# Patient Record
Sex: FEMALE | Race: BLACK OR AFRICAN AMERICAN | NOT HISPANIC OR LATINO | Employment: FULL TIME | URBAN - METROPOLITAN AREA
[De-identification: names, ages, dates, MRNs, and addresses within clinical notes are randomized per-mention and may not be internally consistent; named-entity substitution may affect disease eponyms.]

---

## 2017-01-06 ENCOUNTER — HOSPITAL ENCOUNTER (OUTPATIENT)
Dept: RADIOLOGY | Facility: HOSPITAL | Age: 52
Discharge: HOME | End: 2017-01-06
Attending: FAMILY MEDICINE
Payer: COMMERCIAL

## 2017-01-06 ENCOUNTER — OFFICE VISIT (OUTPATIENT)
Dept: INTERNAL MEDICINE | Facility: CLINIC | Age: 52
End: 2017-01-06
Payer: COMMERCIAL

## 2017-01-06 VITALS
HEIGHT: 62 IN | DIASTOLIC BLOOD PRESSURE: 82 MMHG | WEIGHT: 232.13 LBS | TEMPERATURE: 98 F | BODY MASS INDEX: 42.72 KG/M2 | SYSTOLIC BLOOD PRESSURE: 138 MMHG

## 2017-01-06 DIAGNOSIS — M25.562 LEFT KNEE PAIN, UNSPECIFIED CHRONICITY: ICD-10-CM

## 2017-01-06 DIAGNOSIS — M25.562 LEFT KNEE PAIN, UNSPECIFIED CHRONICITY: Primary | ICD-10-CM

## 2017-01-06 DIAGNOSIS — G43.709 CHRONIC MIGRAINE WITHOUT AURA WITHOUT STATUS MIGRAINOSUS, NOT INTRACTABLE: ICD-10-CM

## 2017-01-06 DIAGNOSIS — I10 ESSENTIAL HYPERTENSION: ICD-10-CM

## 2017-01-06 PROBLEM — E66.01 OBESITY, CLASS III, BMI 40-49.9 (MORBID OBESITY): Status: ACTIVE | Noted: 2017-01-06

## 2017-01-06 PROCEDURE — 3079F DIAST BP 80-89 MM HG: CPT | Mod: S$GLB,,, | Performed by: FAMILY MEDICINE

## 2017-01-06 PROCEDURE — 99214 OFFICE O/P EST MOD 30 MIN: CPT | Mod: S$GLB,,, | Performed by: FAMILY MEDICINE

## 2017-01-06 PROCEDURE — 73560 X-RAY EXAM OF KNEE 1 OR 2: CPT | Mod: 26,59,LT, | Performed by: RADIOLOGY

## 2017-01-06 PROCEDURE — 73562 X-RAY EXAM OF KNEE 3: CPT | Mod: 26,LT,, | Performed by: RADIOLOGY

## 2017-01-06 PROCEDURE — 3075F SYST BP GE 130 - 139MM HG: CPT | Mod: S$GLB,,, | Performed by: FAMILY MEDICINE

## 2017-01-06 PROCEDURE — 1159F MED LIST DOCD IN RCRD: CPT | Mod: S$GLB,,, | Performed by: FAMILY MEDICINE

## 2017-01-06 PROCEDURE — 73560 X-RAY EXAM OF KNEE 1 OR 2: CPT | Mod: TC,59,PO,RT,LT

## 2017-01-06 PROCEDURE — 99999 PR PBB SHADOW E&M-EST. PATIENT-LVL III: CPT | Mod: PBBFAC,,, | Performed by: FAMILY MEDICINE

## 2017-01-06 RX ORDER — DICLOFENAC SODIUM 75 MG/1
75 TABLET, DELAYED RELEASE ORAL 2 TIMES DAILY
Qty: 30 TABLET | Refills: 0 | Status: SHIPPED | OUTPATIENT
Start: 2017-01-06 | End: 2017-01-27 | Stop reason: ALTCHOICE

## 2017-01-06 RX ORDER — BUTALBITAL, ACETAMINOPHEN AND CAFFEINE 50; 325; 40 MG/1; MG/1; MG/1
1 TABLET ORAL DAILY PRN
Qty: 30 TABLET | Refills: 0 | Status: SHIPPED | OUTPATIENT
Start: 2017-01-06 | End: 2017-02-22 | Stop reason: SDUPTHER

## 2017-01-06 RX ORDER — LISINOPRIL AND HYDROCHLOROTHIAZIDE 20; 25 MG/1; MG/1
1 TABLET ORAL DAILY
Qty: 90 TABLET | Refills: 3 | Status: SHIPPED | OUTPATIENT
Start: 2017-01-06

## 2017-01-06 NOTE — PROGRESS NOTES
"Subjective:   Patient ID: Rubi Powell is a 51 y.o. female.  Chief Complaint:  Establish Care    HPI Comments: Here to establish care.  Needs refills on blood pressure and migraine headache medication.  Complains of left knee pain.  History osteoarthritis both knees but this feels different.  Denies trauma or inciting event.  Pain medial and anterior.  Reports swelling.  Decreased range of motion.  Instability.  Some snapping/clicking.  Took a friend's Voltaren which helped.  Reports takes blood pressure daily at home and all readings less than 140/90.  Reports takes Fioricet as needed for headache.  Questionably 4-6 a month.  Only needs one pill for each episode.    Knee Pain    There was no injury mechanism. The pain is present in the left knee. The quality of the pain is described as aching. The pain is at a severity of 5/10. The pain is moderate. The pain has been constant since onset. Associated symptoms include a loss of motion and muscle weakness. Pertinent negatives include no inability to bear weight, loss of sensation, numbness or tingling. The symptoms are aggravated by movement, weight bearing and palpation. She has tried NSAIDs for the symptoms. The treatment provided moderate relief.     Review of Systems   Respiratory: Negative for cough, chest tightness, shortness of breath and wheezing.    Cardiovascular: Negative for chest pain, palpitations and leg swelling.   Gastrointestinal: Negative for abdominal pain, constipation, diarrhea, nausea and vomiting.   Genitourinary: Negative for difficulty urinating.   Musculoskeletal: Negative for myalgias.   Skin: Negative for rash.   Neurological: Negative for tingling, syncope, weakness, light-headedness and numbness.     Objective:     Visit Vitals    /82    Temp 97.5 °F (36.4 °C) (Tympanic)    Ht 5' 2" (1.575 m)    Wt 105.3 kg (232 lb 2.3 oz)    BMI 42.46 kg/m2     Physical Exam   Constitutional: Vital signs are normal. She appears " well-developed and well-nourished. No distress.   Morbid Obesity   Neck: No JVD present.   Cardiovascular: Normal rate, regular rhythm and normal heart sounds.  Exam reveals no gallop and no friction rub.    No murmur heard.  Pulmonary/Chest: Effort normal and breath sounds normal. She has no wheezes. She has no rhonchi. She has no rales.   Abdominal: Soft. She exhibits no distension. There is no tenderness.   Musculoskeletal: She exhibits no edema.        Left knee: She exhibits decreased range of motion, swelling and MCL laxity. She exhibits no effusion, no ecchymosis, no deformity, no laceration, no erythema, normal alignment, no LCL laxity, normal patellar mobility, no bony tenderness and normal meniscus. Tenderness found. Medial joint line, MCL and patellar tendon tenderness noted. No LCL tenderness noted.   Skin: Skin is warm and dry. No rash noted.   Psychiatric: She has a normal mood and affect.   Nursing note and vitals reviewed.    Assessment:     1. Left knee pain, unspecified chronicity    2. Essential hypertension    3. Chronic migraine without aura without status migrainosus, not intractable      Plan:   Left knee pain, unspecified chronicity  -     X-Ray Knee Complete 4 or More Views Left; Future; Expected date: 1/6/17  -     diclofenac (VOLTAREN) 75 MG EC tablet; Take 1 tablet (75 mg total) by mouth 2 (two) times daily.  Dispense: 30 tablet; Refill: 0  Rice, NSAIDs, check x-ray, reevaluate next week.  If no significant improvement, consider MRI for possible MCL or meniscal tear.    Essential hypertension  -     lisinopril-hydrochlorothiazide (PRINZIDE,ZESTORETIC) 20-25 mg Tab; Take 1 tablet by mouth once daily.  Dispense: 90 tablet; Refill: 3  BP controlled.  Continue present medication.    Chronic migraine without aura without status migrainosus, not intractable  -     butalbital-acetaminophen-caffeine -40 mg (FIORICET, ESGIC) -40 mg per tablet; Take 1 tablet by mouth daily as needed for  Headaches.  Dispense: 30 tablet; Refill: 0  Refilled 30 pills.  Monitor use.  Discussed this prescription should last 3 months.  If refill needed sooner, needs to consider daily preventative medication.    RTC  1 week

## 2017-01-06 NOTE — MR AVS SNAPSHOT
[facility]  [address] [place] [address]  Phone: [phone]  Fax: [phone]                  Rubi Powell   2017 2:40 PM   Office Visit    Description:  Female : 1965   Provider:  Gregory Vega MD   Department:  [facility] - Internal Medicine           Reason for Visit     Establish Care           Diagnoses this Visit        Comments    Left knee pain, unspecified chronicity    -  Primary     Essential hypertension         Chronic migraine without aura without status migrainosus, not intractable                To Do List           Future Appointments        Provider Department Dept Phone    2017 3:30 PM SUM XR2 [facility] [phone]    2017 2:40 PM Gregory Vega MD [facility] [phone]      Goals (5 Years of Data)     None      Follow-Up and Disposition     Return in about 1 week (around 2017).       These Medications        Disp Refills Start End    lisinopril-hydrochlorothiazide (PRINZIDE,ZESTORETIC) 20-25 mg Tab 90 tablet 3 2017     Take 1 tablet by mouth once daily. - Oral    Pharmacy: [facility] - [facility]  [facility] Ph #: [phone]       diclofenac (VOLTAREN) 75 MG EC tablet 30 tablet 0 2017     Take 1 tablet (75 mg total) by mouth 2 (two) times daily. - Oral    Pharmacy: [facility] - [place] -  [facility] Ph #: [phone]       butalbital-acetaminophen-caffeine -40 mg (FIORICET, ESGIC) -40 mg per tablet 30 tablet 0 2017     Take 1 tablet by mouth daily as needed for Headaches. - Oral    Pharmacy: [facility] - [place], [place] -  [facility] Ph #: [phone]         [facility] On [facility]     [facility] On Call Nurse Care Line -  Assistance  Registered nurses in the [facility] provide clinical advisement, health education, appointment booking, and other advisory services.  Call for this  free service at [phone].             Medications           Message regarding Medications     Verify the changes and/or additions to your medication regime listed below are the same as discussed with your clinician today.  If any of these changes or additions are incorrect, please notify your healthcare provider.        START taking these NEW medications        Refills    diclofenac (VOLTAREN) 75 MG EC tablet 0    Sig: Take 1 tablet (75 mg total) by mouth 2 (two) times daily.    Class: Normal    Route: Oral      CHANGE how you are taking these medications     Start Taking Instead of    lisinopril-hydrochlorothiazide (PRINZIDE,ZESTORETIC) 20-25 mg Tab lisinopril-hydrochlorothiazide (PRINZIDE,ZESTORETIC) 20-25 mg Tab    Dosage:  Take 1 tablet by mouth once daily.     Reason for Change:  Reorder     butalbital-acetaminophen-caffeine -40 mg (FIORICET, ESGIC) -40 mg per tablet butalbital-acetaminophen-caffeine -40 mg (FIORICET, ESGIC) -40 mg per tablet    Dosage:  Take 1 tablet by mouth daily as needed for Headaches.     Reason for Change:  Reorder       STOP taking these medications     promethazine-dextromethorphan (PROMETHAZINE-DM) 6.25-15 mg/5 mL Syrp Take 5 mLs by mouth nightly as needed (Cough).    fluticasone (FLONASE) 50 mcg/actuation nasal spray 2 sprays by Each Nare route once daily.           Verify that the below list of medications is an accurate representation of the medications you are currently taking.  If none reported, the list may be blank. If incorrect, please contact your healthcare provider. Carry this list with you in case of emergency.           Current Medications     butalbital-acetaminophen-caffeine -40 mg (FIORICET, ESGIC) -40 mg per tablet Take 1 tablet by mouth daily as needed for Headaches.    lisinopril-hydrochlorothiazide (PRINZIDE,ZESTORETIC) 20-25 mg Tab Take 1 tablet by mouth once daily.    diclofenac (VOLTAREN) 75 MG EC tablet Take 1 tablet  "(75 mg total) by mouth 2 (two) times daily.           Clinical Reference Information           Vital Signs - Last Recorded  Most recent update: 2017  3:09 PM by Gregory Vega MD    BP Temp Ht Wt BMI    138/82 97.5 °F (36.4 °C) (Tympanic) 5' 2" (1.575 m) 105.3 kg (232 lb 2.3 oz) 42.46 kg/m2      Blood Pressure          Most Recent Value    BP  138/82      Allergies as of 2017     No Known Allergies      Immunizations Administered on Date of Encounter - 2017     None      Orders Placed During Today's Visit     Future Labs/Procedures Expected by Expires    X-Ray Knee Complete 4 or More Views Left  2017      [facility] Sign-Up     Activating your [facility] account is as easy as 1-2-3!     1) Visit [email or web address], select Sign Up Now, enter this activation code and your date of birth, then select Next.  [email or web address]  Expires: 2017  3:13 PM      2) Create a username and password to use when you visit [facility] in the future and select a security question in case you lose your password and select Next.    3) Enter your e-mail address and click Sign Up!    Additional Information  If you have questions, please e-mail [email or web address] or call [phone] to talk to our [facility] staff. Remember, [facility] is NOT to be used for urgent needs. For medical emergencies, dial 911.         Smoking Cessation     If you would like to quit smoking:   You may be eligible for free services if you are a [place] resident and started smoking cigarettes before 1988.  Call the Smoking Cessation Trust (SCT) toll free at (980) 198-4991 or (685) 014-0286.   Call 2-861-QUIT-NOW if you do not meet the above criteria.            "

## 2017-01-10 ENCOUNTER — TELEPHONE (OUTPATIENT)
Dept: INTERNAL MEDICINE | Facility: CLINIC | Age: 52
End: 2017-01-10

## 2017-01-13 ENCOUNTER — OFFICE VISIT (OUTPATIENT)
Dept: INTERNAL MEDICINE | Facility: CLINIC | Age: 52
End: 2017-01-13
Payer: COMMERCIAL

## 2017-01-13 VITALS
SYSTOLIC BLOOD PRESSURE: 140 MMHG | TEMPERATURE: 97 F | WEIGHT: 236.13 LBS | DIASTOLIC BLOOD PRESSURE: 72 MMHG | BODY MASS INDEX: 43.19 KG/M2

## 2017-01-13 DIAGNOSIS — M23.92 INTERNAL DERANGEMENT OF KNEE, LEFT: ICD-10-CM

## 2017-01-13 DIAGNOSIS — M25.562 CHRONIC PAIN OF LEFT KNEE: Primary | ICD-10-CM

## 2017-01-13 DIAGNOSIS — G89.29 CHRONIC PAIN OF LEFT KNEE: Primary | ICD-10-CM

## 2017-01-13 PROCEDURE — 3077F SYST BP >= 140 MM HG: CPT | Mod: S$GLB,,, | Performed by: FAMILY MEDICINE

## 2017-01-13 PROCEDURE — 3078F DIAST BP <80 MM HG: CPT | Mod: S$GLB,,, | Performed by: FAMILY MEDICINE

## 2017-01-13 PROCEDURE — 99999 PR PBB SHADOW E&M-EST. PATIENT-LVL III: CPT | Mod: PBBFAC,,, | Performed by: FAMILY MEDICINE

## 2017-01-13 PROCEDURE — 1159F MED LIST DOCD IN RCRD: CPT | Mod: S$GLB,,, | Performed by: FAMILY MEDICINE

## 2017-01-13 PROCEDURE — 99213 OFFICE O/P EST LOW 20 MIN: CPT | Mod: S$GLB,,, | Performed by: FAMILY MEDICINE

## 2017-01-13 RX ORDER — TRAMADOL HYDROCHLORIDE 50 MG/1
50 TABLET ORAL EVERY 6 HOURS PRN
Qty: 30 TABLET | Refills: 0 | Status: SHIPPED | OUTPATIENT
Start: 2017-01-13 | End: 2017-01-23

## 2017-01-13 NOTE — PROGRESS NOTES
Subjective:   Patient ID: Rubi Powell is a 51 y.o. female.  Chief Complaint:  Knee Pain    HPI Comments: Presents for follow-up of chronic left knee pain.  Voltaren did not help.  Still reports instability but no popping, snapping, clicking, or locking.  X-ray with arthritis more prominent left than right.  But with lateral compartment narrowing.  Patient's pain and tenderness is medial.  No overall change in pain pattern since last visit.      Review of Systems   Constitutional: Negative for chills and fever.   Gastrointestinal: Negative for abdominal pain, diarrhea, nausea and vomiting.   Musculoskeletal: Positive for arthralgias. Negative for back pain, gait problem, joint swelling, myalgias, neck pain and neck stiffness.   Skin: Negative for rash.     Objective:     Visit Vitals    BP (!) 140/72 (BP Location: Right arm, Patient Position: Sitting, BP Method: Manual)    Temp 97.1 °F (36.2 °C) (Tympanic)    Wt 107.1 kg (236 lb 1.8 oz)    BMI 43.19 kg/m2     Physical Exam   Constitutional: Vital signs are normal. She appears well-developed and well-nourished. No distress.   Morbid Obesity   Musculoskeletal: She exhibits no edema.        Left knee: She exhibits decreased range of motion, swelling and MCL laxity. She exhibits no effusion, no ecchymosis, no deformity, no laceration, no erythema, normal alignment, no LCL laxity, normal patellar mobility, no bony tenderness and normal meniscus. Tenderness found. Medial joint line, MCL and patellar tendon tenderness noted. No LCL tenderness noted.   Skin: Skin is warm and dry. No rash noted.   Psychiatric: She has a normal mood and affect.   Nursing note and vitals reviewed.    Assessment:     1. Chronic pain of left knee    2. Internal derangement of knee, left      Plan:   Based on minimal response to NSAIDs, x-ray with lateral but not medial arthritis, and area of tenderness and laxity, high probability of MCL strain/tear or meniscal tear.  Check MRI.   Tramadol for pain.  Additional treatment recommendations after review of imaging.  -     MRI Lower Extremity Joint WO Cont Left; Future; Expected date: 1/13/17  -     tramadol (ULTRAM) 50 mg tablet; Take 1 tablet (50 mg total) by mouth every 6 (six) hours as needed for Pain.  Dispense: 30 tablet; Refill: 0  RTC 1 month

## 2017-01-13 NOTE — MR AVS SNAPSHOT
[facility]  [address] [place] [address]  Phone: [phone]  Fax: [phone]                  Rubi Powell   2017 2:40 PM   Office Visit    Description:  Female : 1965   Provider:  Gregory Vega MD   Department:  [facility] - Internal Medicine           Reason for Visit     Knee Pain           Diagnoses this Visit        Comments    Chronic pain of left knee    -  Primary     Internal derangement of knee, left                To Do List           Future Appointments        Provider Department Dept Phone    2017 7:15 AM [facility] MRI [facility] [phone]      Goals (5 Years of Data)     None      Follow-Up and Disposition     Return in about 1 month (around 2017).       These Medications        Disp Refills Start End    tramadol (ULTRAM) 50 mg tablet 30 tablet 0 2017    Take 1 tablet (50 mg total) by mouth every 6 (six) hours as needed for Pain. - Oral    Pharmacy: [facility] - [place] [address] Ph #: [phone]         [facility] On Call     [facility] Nurse Care Line -  Assistance  Registered nurses in the [facility] provide clinical advisement, health education, appointment booking, and other advisory services.  Call for this free service at [phone].             Medications           Message regarding Medications     Verify the changes and/or additions to your medication regime listed below are the same as discussed with your clinician today.  If any of these changes or additions are incorrect, please notify your healthcare provider.        START taking these NEW medications        Refills    tramadol (ULTRAM) 50 mg tablet 0    Sig: Take 1 tablet (50 mg total) by mouth every 6 (six) hours as needed for Pain.    Class: Print    Route: Oral           Verify that the below list of medications is an accurate representation of the medications you are currently  taking.  If none reported, the list may be blank. If incorrect, please contact your healthcare provider. Carry this list with you in case of emergency.           Current Medications     diclofenac (VOLTAREN) 75 MG EC tablet Take 1 tablet (75 mg total) by mouth 2 (two) times daily.    lisinopril-hydrochlorothiazide (PRINZIDE,ZESTORETIC) 20-25 mg Tab Take 1 tablet by mouth once daily.    butalbital-acetaminophen-caffeine -40 mg (FIORICET, ESGIC) -40 mg per tablet Take 1 tablet by mouth daily as needed for Headaches.    tramadol (ULTRAM) 50 mg tablet Take 1 tablet (50 mg total) by mouth every 6 (six) hours as needed for Pain.           Clinical Reference Information           Vital Signs - Last Recorded  Most recent update: 2017  2:37 PM by Taylor Mcintosh LPN    BP Temp Wt BMI       (!) 140/72 (BP Location: Right arm, Patient Position: Sitting, BP Method: Manual) 97.1 °F (36.2 °C) (Tympanic) 107.1 kg (236 lb 1.8 oz) 43.19 kg/m2       Blood Pressure          Most Recent Value    BP  (!)  140/72      Allergies as of 2017     No Known Allergies      Immunizations Administered on Date of Encounter - 2017     None      Orders Placed During Today's Visit     Future Labs/Procedures Expected by Expires    MRI Lower Extremity Joint WO Cont Left  2017      [facility] Sign-Up     Activating your [facility] account is as easy as 1-2-3!     1) Visit [email or web address], select Sign Up Now, enter this activation code and your date of birth, then select Next.  [email or web address]  Expires: 2017  3:13 PM      2) Create a username and password to use when you visit [facility] in the future and select a security question in case you lose your password and select Next.    3) Enter your e-mail address and click Sign Up!    Additional Information  If you have questions, please e-mail [email or web address] or call [phone] to talk to our [facility] staff. Remember, [facility] is NOT to be used for  urgent needs. For medical emergencies, dial 911.         Smoking Cessation     If you would like to quit smoking:   You may be eligible for free services if you are a [place] resident and started smoking cigarettes before September 1, 1988.  Call the Smoking Cessation Trust (SCT) toll free at (000) 510-8118 or (752) 753-8747.   Call 2-159-QUIT-NOW if you do not meet the above criteria.

## 2017-01-18 ENCOUNTER — TELEPHONE (OUTPATIENT)
Dept: RADIOLOGY | Facility: HOSPITAL | Age: 52
End: 2017-01-18

## 2017-01-19 ENCOUNTER — TELEPHONE (OUTPATIENT)
Dept: INTERNAL MEDICINE | Facility: CLINIC | Age: 52
End: 2017-01-19

## 2017-01-19 ENCOUNTER — HOSPITAL ENCOUNTER (OUTPATIENT)
Dept: RADIOLOGY | Facility: HOSPITAL | Age: 52
Discharge: HOME | End: 2017-01-19
Attending: FAMILY MEDICINE
Payer: COMMERCIAL

## 2017-01-19 DIAGNOSIS — G89.29 CHRONIC PAIN OF LEFT KNEE: ICD-10-CM

## 2017-01-19 DIAGNOSIS — M23.92 INTERNAL DERANGEMENT OF KNEE, LEFT: Primary | ICD-10-CM

## 2017-01-19 DIAGNOSIS — M25.562 CHRONIC PAIN OF LEFT KNEE: ICD-10-CM

## 2017-01-19 DIAGNOSIS — M23.92 INTERNAL DERANGEMENT OF KNEE, LEFT: ICD-10-CM

## 2017-01-19 PROCEDURE — 73721 MRI JNT OF LWR EXTRE W/O DYE: CPT | Mod: 26,LT,, | Performed by: RADIOLOGY

## 2017-01-19 PROCEDURE — 73721 MRI JNT OF LWR EXTRE W/O DYE: CPT | Mod: TC,PO,LT

## 2017-01-19 NOTE — TELEPHONE ENCOUNTER
----- Message from Gregory Vega MD sent at 1/19/2017  9:37 AM CST -----  MRI confirms a tear for medial meniscus.  She will need a scope/procedure to fix this.  Ortho [facility] referral ordered.

## 2017-01-27 ENCOUNTER — OFFICE VISIT (OUTPATIENT)
Dept: ORTHOPEDICS | Facility: CLINIC | Age: 52
End: 2017-01-27
Payer: COMMERCIAL

## 2017-01-27 VITALS
SYSTOLIC BLOOD PRESSURE: 124 MMHG | BODY MASS INDEX: 42.6 KG/M2 | WEIGHT: 231.5 LBS | HEIGHT: 62 IN | DIASTOLIC BLOOD PRESSURE: 85 MMHG | HEART RATE: 78 BPM

## 2017-01-27 DIAGNOSIS — G89.29 CHRONIC PAIN OF LEFT KNEE: Primary | ICD-10-CM

## 2017-01-27 DIAGNOSIS — M25.562 CHRONIC PAIN OF LEFT KNEE: Primary | ICD-10-CM

## 2017-01-27 DIAGNOSIS — M23.204 DEGENERATIVE TEAR OF MEDIAL MENISCUS OF LEFT KNEE: ICD-10-CM

## 2017-01-27 PROCEDURE — 3079F DIAST BP 80-89 MM HG: CPT | Mod: S$GLB,,, | Performed by: PHYSICIAN ASSISTANT

## 2017-01-27 PROCEDURE — 3074F SYST BP LT 130 MM HG: CPT | Mod: S$GLB,,, | Performed by: PHYSICIAN ASSISTANT

## 2017-01-27 PROCEDURE — 99214 OFFICE O/P EST MOD 30 MIN: CPT | Mod: 25,S$GLB,, | Performed by: PHYSICIAN ASSISTANT

## 2017-01-27 PROCEDURE — 1159F MED LIST DOCD IN RCRD: CPT | Mod: S$GLB,,, | Performed by: PHYSICIAN ASSISTANT

## 2017-01-27 PROCEDURE — 20610 DRAIN/INJ JOINT/BURSA W/O US: CPT | Mod: LT,S$GLB,, | Performed by: PHYSICIAN ASSISTANT

## 2017-01-27 PROCEDURE — 99999 PR PBB SHADOW E&M-EST. PATIENT-LVL III: CPT | Mod: PBBFAC,,, | Performed by: PHYSICIAN ASSISTANT

## 2017-01-27 RX ORDER — METHYLPREDNISOLONE ACETATE 80 MG/ML
80 INJECTION, SUSPENSION INTRA-ARTICULAR; INTRALESIONAL; INTRAMUSCULAR; SOFT TISSUE ONCE
Status: COMPLETED | OUTPATIENT
Start: 2017-01-27 | End: 2017-01-27

## 2017-01-27 RX ORDER — MELOXICAM 15 MG/1
15 TABLET ORAL DAILY
Qty: 30 TABLET | Refills: 1 | Status: SHIPPED | OUTPATIENT
Start: 2017-01-27 | End: 2017-04-12 | Stop reason: SDUPTHER

## 2017-01-27 RX ADMIN — METHYLPREDNISOLONE ACETATE 80 MG: 80 INJECTION, SUSPENSION INTRA-ARTICULAR; INTRALESIONAL; INTRAMUSCULAR; SOFT TISSUE at 09:01

## 2017-01-27 NOTE — LETTER
January 27, 2017      Gregory Vega MD  [address] [place] [address]           [facility]  [address] [place] [address]  Phone: [phone]  Fax: [phone]          Patient: Rubi Powell   MR Number: [id number]   YOB: 1965   Date of Visit: 1/27/2017       Dear Dr. Gregory Vega:    Thank you for referring Rubi Powell to me for evaluation. Attached you will find relevant portions of my assessment and plan of care.    If you have questions, please do not hesitate to call me. I look forward to following Rubi Powell along with you.    Sincerely,    AKBAR Franceosure  CC:  No Recipients    If you would like to receive this communication electronically, please contact [email or web address] or [phone] to request more information on [employer] access.    For providers and/or their staff who would like to refer a patient to [facility], please contact us through our one-stop-shop provider referral line, [facility], at [phone].    If you feel you have received this communication in error or would no longer like to receive these types of communications, please e-mail [email or web address]

## 2017-01-27 NOTE — PROGRESS NOTES
Subjective:      Patient ID: Rubi Powell is a 51 y.o. female.    Chief Complaint: Pain of the Left Knee      HPI: Rubi Powell  is a 51 y.o. female who c/o Pain of the Left Knee   for duration of about 2 months.  She denies an inciting injury.  He states it started bothering her after she went to a parade around the time of Thanksgiving.  Pain level at worst is an 8 out of 10 in severity.  Quality is aching, throbbing, sharp, burning, and shooting.  It is constant and intermittently worse.  Alleviating factors include nothing.  If she has had no relief with tramadol.  No relief with Voltaren.  Aggravating factors include morning time as well as nighttime.  She has difficulty with going up and down stairs, as well as kneeling and squatting.  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  She complains of associated swelling intermittently as well as some stiffness.    Review of Systems   Constitution: Negative for fever.   HENT: Negative for headaches.    Cardiovascular: Negative for chest pain.   Respiratory: Negative for cough and shortness of breath.    Skin: Negative for rash.   Musculoskeletal: Positive for joint pain, joint swelling and stiffness.   Gastrointestinal: Negative for heartburn.   Neurological: Negative for numbness.         Objective:        General    Nursing note and vitals reviewed.  Constitutional: She is oriented to person, place, and time. She appears well-developed and well-nourished.   HENT:   Head: Normocephalic and atraumatic.   Eyes: EOM are normal.   Cardiovascular: Normal rate and regular rhythm.    Pulmonary/Chest: Effort normal.   Abdominal: Soft.   Neurological: She is alert and oriented to person, place, and time.   Psychiatric: She has a normal mood and affect. Her behavior is normal.             Left Knee Exam     Inspection   Erythema: absent  Swelling: absent  Effusion: absent  Deformity: deformity  Bruising: absent    Tenderness   The  patient tender to palpation of the condyle, medial joint line, lateral joint line and pes anserinus.    Range of Motion   Extension: normal   Flexion:  110 abnormal     Tests   Meniscus   Cora:  Medial - positive Lateral - positive  Stability Lachman: normal (-1 to 2mm) PCL-Posterior Drawer: normal (0 to 2mm)  MCL - Valgus: normal (0 to 2mm)  LCL - Varus: normal (0 to 2mm)  Patella   Patellar Apprehension: negative  Patellar Tracking: normal  Patellar Grind: negative    Other   Meniscal Cyst: absent  Popliteal (Baker's) Cyst: absent  Sensation: normal    Comments:  Comp soft, cap refill < 2 sec.    Muscle Strength   Left Lower Extremity   Quadriceps:  5/5   Hamstrin/5     Vascular Exam       Edema  Left Lower Leg: absent            Xray:   Left knee from 2017 images and report were reviewed today.  I agree with the radiologist's interpretation.  None there is mild joint space narrowing involving the lateral compartment the left knee.  No acute fracture or dislocation.  No joint effusion.    MRI:  Left knee from 2017 images and report were reviewed today.  I agree with the radiologist's interpretation.  Horizontal degenerative type tear seen involving the body of the medial meniscus.      Assessment:       Encounter Diagnoses   Name Primary?   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    Diagnoses and all orders for this visit:    Chronic pain of left knee  -     meloxicam (MOBIC) 15 MG tablet; Take 1 tablet (15 mg total) by mouth once daily. Take with food  -     Ambulatory Referral to Physical/Occupational Therapy  -     methylPREDNISolone acetate injection 80 mg; Inject 1 mL (80 mg total) into the articular space once.    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 Take with food  -     Ambulatory Referral to  Physical/Occupational Therapy  -     methylPREDNISolone acetate injection 80 mg; Inject 1 mL (80 mg total) into the articular space once.    Ms. Powell comes in today to be evaluated for the left knee.  This is new problems to me.  She's had MRI and x-ray evaluation.  No evidence of significant arthritis noted on either exam.  He does have a degenerative tear of her medial meniscus on the left knee.  We had an extensive discussion regarding since for the left knee.  We have discussed surgical options as well as nonsurgical options.  We have discussed risks and benefits of both options.  At this time, she states surgery is not really an option for her.  She like to try some conservative management, at least until she can get coverage for herself at work.  She works as a caregiver and also works as a .  At this time, she would like to proceed with a steroid injection in the left knee.  We have discussed risks and benefits of a steroid injection.  She has tolerated them well in the past in the right knee.  I will also get her into a neoprene hinged knee brace.  She states that the anti-inflammatory she is currently taking is not helping, so I'll discontinue that and prescribe meloxicam instead.  Additionally, I would like her to do some formal physical therapy.  She is concerned about financial responsibility.  I've encouraged her to go to at least 1 or 2 sessions so they can give her a home exercise program to work on her own.  I certainly do not want it to be a financial burden for her.  Rather than have her make an appointment to come back and see me, I would ask that she she is doing over the next 4-6 weeks.  If she is not improving, I would like her to see one of our surgeons here in the department.  If she has any problems, she will certainly let me know.  If she develops any GI side effects from the meloxicam, she should discontinue it.  Patient verbalizes understanding and agrees with the  above.    Return in about 5 weeks (around 3/3/2017), or if symptoms worsen or fail to improve.        The patient understands, chooses and consents to this plan and accepts all   the risks which include but are not limited to the risks mentioned above.     Left Knee Injection Report:  After verbal consent was obtained for left knee injection, patient ID, site, and side were verified.  The  left  knee was sterilly prepped in the standard fashion.  A 22-gauge needle was introduced into left knee joint from an veronica-lateral site without complication. The left knee was then injected with 20 mg lidocaine plain and 80 mg depomedrol.  A sterile bandaid was applied.  The patient was informed to apply an ice pack approximately 10min once arriving home and not to do anything strenuous for 24hours.  She was instructed to call if there were any problems. The patient was discharged in stable condition.    Disclaimer: This note was prepared using a voice recognition system and is likely to have sound alike errors within the text.

## 2017-02-22 ENCOUNTER — TELEPHONE (OUTPATIENT)
Dept: INTERNAL MEDICINE | Facility: CLINIC | Age: 52
End: 2017-02-22

## 2017-02-22 DIAGNOSIS — G43.709 CHRONIC MIGRAINE WITHOUT AURA WITHOUT STATUS MIGRAINOSUS, NOT INTRACTABLE: ICD-10-CM

## 2017-02-22 RX ORDER — BUTALBITAL, ACETAMINOPHEN AND CAFFEINE 50; 325; 40 MG/1; MG/1; MG/1
1 TABLET ORAL DAILY PRN
Qty: 30 TABLET | Refills: 0 | Status: SHIPPED | OUTPATIENT
Start: 2017-02-22 | End: 2017-04-12 | Stop reason: SDUPTHER

## 2017-02-22 NOTE — TELEPHONE ENCOUNTER
----- Message from Danita Pablo sent at 2/22/2017 11:24 AM CST -----  Contact: pt  States she needs a refill on her headache medicine. Pt uses      [facility] - [place] [address]  [address] [place] [address]  Phone: [phone] Fax: [phone]    Please call pt at [phone]. Thank you

## 2017-04-05 ENCOUNTER — TELEPHONE (OUTPATIENT)
Dept: INTERNAL MEDICINE | Facility: CLINIC | Age: 52
End: 2017-04-05

## 2017-04-05 NOTE — TELEPHONE ENCOUNTER
Pt had recent hospital visit at [facility] and had pna. Appears needs followup but ED visit sent to me. I have not seen patient. Appears she is seeing you as PCP. You can determine followup. Thanks.

## 2017-04-06 NOTE — TELEPHONE ENCOUNTER
MD Taylor Jaramillo LPN        Caller: Unspecified (Yesterday,  1:05 PM)                     Please call and schedule 6 week follow up

## 2017-04-12 ENCOUNTER — TELEPHONE (OUTPATIENT)
Dept: INTERNAL MEDICINE | Facility: CLINIC | Age: 52
End: 2017-04-12

## 2017-04-12 DIAGNOSIS — M25.562 CHRONIC PAIN OF LEFT KNEE: ICD-10-CM

## 2017-04-12 DIAGNOSIS — G89.29 CHRONIC PAIN OF LEFT KNEE: ICD-10-CM

## 2017-04-12 DIAGNOSIS — M23.204 DEGENERATIVE TEAR OF MEDIAL MENISCUS OF LEFT KNEE: ICD-10-CM

## 2017-04-12 DIAGNOSIS — G43.709 CHRONIC MIGRAINE WITHOUT AURA WITHOUT STATUS MIGRAINOSUS, NOT INTRACTABLE: ICD-10-CM

## 2017-04-12 RX ORDER — BUTALBITAL, ACETAMINOPHEN AND CAFFEINE 50; 325; 40 MG/1; MG/1; MG/1
1 TABLET ORAL DAILY PRN
Qty: 30 TABLET | Refills: 0 | Status: SHIPPED | OUTPATIENT
Start: 2017-04-12 | End: 2017-06-07 | Stop reason: SDUPTHER

## 2017-04-12 RX ORDER — MELOXICAM 15 MG/1
15 TABLET ORAL DAILY
Qty: 30 TABLET | Refills: 0 | Status: SHIPPED | OUTPATIENT
Start: 2017-04-12

## 2017-04-12 NOTE — TELEPHONE ENCOUNTER
S/w pt. Pt is requesting refill for Fiorcet. Told pt that I would send message to Dr. Vega and rx refill will be sent in shortly. Pt verbalized understanding.

## 2017-04-12 NOTE — TELEPHONE ENCOUNTER
----- Message from Erma Conroy sent at 4/12/2017  9:16 AM CDT -----  Contact: Patient  Patient is requesting a refill on Meloxicam, Tramadol and Ferocet- [place]. Please call patient back if needed at [phone]. Thank you

## 2017-04-12 NOTE — TELEPHONE ENCOUNTER
Last visit 1/13/17. Last refill for Mobic-1/27/17, last refill for Tramadol- 1/23/17, and last refill for Fiorcet-2/22/17. Next appt is 5/18/17. Please advise.

## 2017-04-13 ENCOUNTER — TELEPHONE (OUTPATIENT)
Dept: INTERNAL MEDICINE | Facility: CLINIC | Age: 52
End: 2017-04-13

## 2017-04-13 NOTE — TELEPHONE ENCOUNTER
----- Message from Carmen Gunn sent at 4/13/2017 12:03 PM CDT -----  Contact: Hema ( [facility] )   Hema ( [facility] ) states he is f/u on a refill request that was faxed over.          .  [facility] - [place], [place] - [address]  [address]  [place] [address]  Phone: [phone] Fax: [phone]

## 2017-04-18 ENCOUNTER — TELEPHONE (OUTPATIENT)
Dept: INTERNAL MEDICINE | Facility: CLINIC | Age: 52
End: 2017-04-18

## 2017-04-18 NOTE — TELEPHONE ENCOUNTER
----- Message from Danielle Bailey sent at 4/18/2017 11:53 AM CDT -----  Contact: [facility]  Requesting a Rx refill for Tramadol.    Pt use..    [facility] - [place] - [address]  [address]  [place] [address]  Phone: [phone] Fax: [phone]

## 2017-05-03 ENCOUNTER — TELEPHONE (OUTPATIENT)
Dept: INTERNAL MEDICINE | Facility: CLINIC | Age: 52
End: 2017-05-03

## 2017-05-03 NOTE — TELEPHONE ENCOUNTER
Received refill request from [facility] for pt's Fiorcet and Tramadol. Last visit 1/13/17. Last refill for Fiorcet was 4/12/17. Last refill for Tramadol was 1/13/17. Next appt 5/18/17. Please advise.

## 2017-05-04 ENCOUNTER — PATIENT OUTREACH (OUTPATIENT)
Dept: ADMINISTRATIVE | Facility: HOSPITAL | Age: 52
End: 2017-05-04
Payer: COMMERCIAL

## 2017-05-04 DIAGNOSIS — Z12.31 SCREENING MAMMOGRAM, ENCOUNTER FOR: Primary | ICD-10-CM

## 2017-05-04 DIAGNOSIS — E66.01 OBESITY, CLASS III, BMI 40-49.9 (MORBID OBESITY): ICD-10-CM

## 2017-05-04 DIAGNOSIS — I10 ESSENTIAL HYPERTENSION: ICD-10-CM

## 2017-05-04 NOTE — LETTER
May 4, 2017    Rubi Powell  [address]  [address]  [place] [address]             [facility]  [address]  [place] [facility] [address]  Phone: [phone] Dear Ms. Powell:    [facility] is committed to your overall health.  To help you get the most out of each of your visits, we will review your information to make sure you are up to date on all of your recommended tests and/or procedures.      Gregory Vega MD has found that you may be due for   Health Maintenance Due   Topic    TETANUS VACCINE     Pneumococcal PPSV23 (Medium Risk) (1)    Pap Smear     Mammogram     Lipid Panel     Colonoscopy       If you have had any of the above done at another facility, please bring the records or information with you so that your record at [facility] will be complete.    If you are currently taking medication, please bring it with you to your appointment for review.    We will be happy to assist you with scheduling any necessary appointments or you may contact the [facility] appointment desk at [phone] to schedule at your convenience.     Thank you for choosing [facility] for your healthcare needs.  If you have any questions or concerns, please don't hesitate to call.    Sincerely,  Gabriella STALEY LPN Care Coordinator  [facility]

## 2017-05-04 NOTE — TELEPHONE ENCOUNTER
MD Taylor Jaramillo LPN        Caller: Unspecified (Yesterday,  4:10 PM)                     Too soon for Fioricet refill.  30 pills should/needs to last her one month.   Denied tramadol.  Reviewed orthopedics noted.  Has meniscal tear.  Surgery surgery was advised.  Chronic/long-term medication not appropriate

## 2017-06-07 DIAGNOSIS — G43.709 CHRONIC MIGRAINE WITHOUT AURA WITHOUT STATUS MIGRAINOSUS, NOT INTRACTABLE: ICD-10-CM

## 2017-06-07 NOTE — TELEPHONE ENCOUNTER
----- Message from Anabel Srivastava sent at 6/7/2017  3:30 PM CDT -----  Contact: pt  Needs refill on Feriosept (sp? for her headaches) sent to [facility] in [place] [address].  Please call pt back at [phone].

## 2017-06-07 NOTE — TELEPHONE ENCOUNTER
Last visit 1/13/17. Last refill 4/12/17. S/w pt and informed that Dr. Vega will be back in clinic tomorrow. Pt verbalized understanding.

## 2017-06-08 RX ORDER — BUTALBITAL, ACETAMINOPHEN AND CAFFEINE 50; 325; 40 MG/1; MG/1; MG/1
1 TABLET ORAL DAILY PRN
Qty: 30 TABLET | Refills: 0 | Status: SHIPPED | OUTPATIENT
Start: 2017-06-08

## 2017-06-08 NOTE — TELEPHONE ENCOUNTER
Approved but patient needs a follow up Appointment for any additional refills.  Last seen January so July will be 6 months  Please call and schedule.

## 2017-11-14 ENCOUNTER — TELEPHONE (OUTPATIENT)
Dept: INTERNAL MEDICINE | Facility: CLINIC | Age: 52
End: 2017-11-14

## 2017-11-14 NOTE — TELEPHONE ENCOUNTER
S/w michael. Pt is requesting refill for meloxicam and tramadol. Informed that Dr. Vega is out of clinic until Thursday and pt has not been seen since January. Advised that pt needed to be seen.

## 2017-11-14 NOTE — TELEPHONE ENCOUNTER
----- Message from Neelam Jung sent at 11/14/2017 10:11 AM CST -----  Contact: [facility]/ [place]   Refill request f/u for two medications. Questions [phone]

## 2018-11-05 ENCOUNTER — TELEPHONE (OUTPATIENT)
Dept: INTERNAL MEDICINE | Facility: CLINIC | Age: 53
End: 2018-11-05

## 2018-11-05 NOTE — TELEPHONE ENCOUNTER
----- Message from Shi Quezada sent at 11/5/2018  2:24 PM CST -----  Contact: Tanisha with [facility]  Please contact Tanisha regarding pt at ([phone])

## 2018-11-05 NOTE — TELEPHONE ENCOUNTER
Tanisha with [facility], informed her that patient haven't been seen by Dr. Vega since 01/2017.

## 2018-11-05 NOTE — TELEPHONE ENCOUNTER
----- Message from Zenobia Ruiz LPN sent at 11/5/2018 12:16 PM CST -----  Contact: [facility] [place]      ----- Message -----  From: Autumn Knott  Sent: 11/5/2018  11:27 AM  To: Jazmine GARCIA Staff    Received tissue specimen from [facility].Requesting referral to be seen at facility per insurance. Please call back at [phone]. please fax [phone].      Thanks,  Autumn Knott

## 2018-11-14 ENCOUNTER — OFFICE VISIT (OUTPATIENT)
Dept: SURGERY | Facility: CLINIC | Age: 53
End: 2018-11-14
Payer: COMMERCIAL

## 2018-11-14 VITALS
HEIGHT: 62 IN | BODY MASS INDEX: 40.61 KG/M2 | SYSTOLIC BLOOD PRESSURE: 147 MMHG | WEIGHT: 220.69 LBS | DIASTOLIC BLOOD PRESSURE: 97 MMHG | TEMPERATURE: 98 F | HEART RATE: 105 BPM

## 2018-11-14 DIAGNOSIS — L72.9 INFECTED CYST OF SKIN: Primary | ICD-10-CM

## 2018-11-14 DIAGNOSIS — L08.9 INFECTED CYST OF SKIN: Primary | ICD-10-CM

## 2018-11-14 PROCEDURE — 10061 I&D ABSCESS COMP/MULTIPLE: CPT | Mod: S$GLB,,, | Performed by: SURGERY

## 2018-11-14 PROCEDURE — 99203 OFFICE O/P NEW LOW 30 MIN: CPT | Mod: 25,S$GLB,, | Performed by: SURGERY

## 2018-11-14 PROCEDURE — 3008F BODY MASS INDEX DOCD: CPT | Mod: CPTII,S$GLB,, | Performed by: SURGERY

## 2018-11-14 PROCEDURE — 3077F SYST BP >= 140 MM HG: CPT | Mod: CPTII,S$GLB,, | Performed by: SURGERY

## 2018-11-14 PROCEDURE — 99999 PR PBB SHADOW E&M-EST. PATIENT-LVL III: CPT | Mod: PBBFAC,,, | Performed by: SURGERY

## 2018-11-14 PROCEDURE — 3080F DIAST BP >= 90 MM HG: CPT | Mod: CPTII,S$GLB,, | Performed by: SURGERY

## 2018-11-14 NOTE — LETTER
November 14, 2018      Stacie Jacobs MD  [address] [place] [address]           [facility]  [address]  [place] [address]  Phone: [phone]  Fax: [phone]          Patient: Rubi Powell   MR Number: [id number]   YOB: 1965   Date of Visit: 11/14/2018       Dear Dr. Stacie Jacobs:    Thank you for referring Rubi Powell to me for evaluation. Attached you will find relevant portions of my assessment and plan of care.    If you have questions, please do not hesitate to call me. I look forward to following Rubi Powell along with you.    Sincerely,    Bryn Oviedo MD    Enclosure  CC:  No Recipients    If you would like to receive this communication electronically, please contact [email or web address] or [phone] to request more information on [employer] Link access.    For providers and/or their staff who would like to refer a patient to [facility], please contact us through our one-stop-shop provider referral line, [facility], at [phone].    If you feel you have received this communication in error or would no longer like to receive these types of communications, please e-mail [email or web address]

## 2018-11-14 NOTE — PROGRESS NOTES
History & Physical    SUBJECTIVE:     History of Present Illness:  Patient is a 53 y.o. female referred for infected cyst.  Patient recently underwent incision and drainage of infected cyst on Monday but is still having purulent drainage and significant pain surrounding the site. She was put on antibiotics at that time.    Chief Complaint   Patient presents with    Cyst     back       Review of patient's allergies indicates:  No Known Allergies    Current Outpatient Medications   Medication Sig Dispense Refill    butalbital-acetaminophen-caffeine -40 mg (FIORICET, ESGIC) -40 mg per tablet Take 1 tablet by mouth daily as needed for Headaches. 30 tablet 0    lisinopril-hydrochlorothiazide (PRINZIDE,ZESTORETIC) 20-25 mg Tab Take 1 tablet by mouth once daily. 90 tablet 3    meloxicam (MOBIC) 15 MG tablet Take 1 tablet (15 mg total) by mouth once daily. Take with food 30 tablet 0     No current facility-administered medications for this visit.        Past Medical History:   Diagnosis Date    AC (acromioclavicular) joint bone spurs     Hypertension      History reviewed. No pertinent surgical history.  History reviewed. No pertinent family history.  Social History     Tobacco Use    Smoking status: Current Every Day Smoker     Packs/day: 0.30     Years: 20.00     Pack years: 6.00     Types: Cigarettes   Substance Use Topics    Alcohol use: No    Drug use: No        Review of Systems:  Review of Systems   Constitutional: Negative for chills, fatigue, fever and unexpected weight change.   Respiratory: Negative for cough, shortness of breath, wheezing and stridor.    Cardiovascular: Negative for chest pain, palpitations and leg swelling.   Gastrointestinal: Negative for abdominal distention, abdominal pain, constipation, diarrhea, nausea and vomiting.   Genitourinary: Negative for difficulty urinating, dysuria, frequency, hematuria and urgency.   Skin: Positive for color change and wound. Negative for  "pallor and rash.   Hematological: Does not bruise/bleed easily.       OBJECTIVE:     Vital Signs (Most Recent)  Temp: 98.4 °F (36.9 °C) (11/14/18 0935)  Pulse: 105 (11/14/18 0935)  BP: (!) 147/97 (11/14/18 0935)  5' 2" (1.575 m)  100.1 kg (220 lb 10.9 oz)     Physical Exam:  Physical Exam   Constitutional: She is oriented to person, place, and time. She appears well-developed and well-nourished.   HENT:   Head: Normocephalic and atraumatic.   Eyes: EOM are normal.   Neck: Neck supple.   Cardiovascular: Normal rate and regular rhythm.   Pulmonary/Chest: Effort normal and breath sounds normal.   Abdominal: Soft. Bowel sounds are normal. She exhibits no distension. There is no tenderness.   Neurological: She is alert and oriented to person, place, and time.   Skin: Skin is warm and dry.        Vitals reviewed.        ASSESSMENT/PLAN:     53-year-old female with infected cyst    PLAN:Plan     Incision and drainage in procedure room\  Wound care daily packing wet to dry gauze  Follow-up 1 week    Procedure:  Prepped with Betadine, local anesthetic injected, incision made over cyst with drainage of pus and cyst contents, part of cyst wall broken up and removed, wound irrigated hemostasis noted packed with wet to dry gauze bandage placed.  Patient tolerated procedure well.       "

## 2018-11-21 ENCOUNTER — OFFICE VISIT (OUTPATIENT)
Dept: SURGERY | Facility: CLINIC | Age: 53
End: 2018-11-21
Payer: COMMERCIAL

## 2018-11-21 VITALS
HEIGHT: 62 IN | SYSTOLIC BLOOD PRESSURE: 161 MMHG | HEART RATE: 96 BPM | DIASTOLIC BLOOD PRESSURE: 103 MMHG | BODY MASS INDEX: 41.71 KG/M2 | WEIGHT: 226.63 LBS | TEMPERATURE: 98 F

## 2018-11-21 DIAGNOSIS — L08.9 INFECTED CYST OF SKIN: Primary | ICD-10-CM

## 2018-11-21 DIAGNOSIS — L72.9 INFECTED CYST OF SKIN: Primary | ICD-10-CM

## 2018-11-21 PROCEDURE — 99999 PR PBB SHADOW E&M-EST. PATIENT-LVL III: CPT | Mod: PBBFAC,,, | Performed by: SURGERY

## 2018-11-21 PROCEDURE — 99024 POSTOP FOLLOW-UP VISIT: CPT | Mod: S$GLB,,, | Performed by: SURGERY

## 2018-11-21 RX ORDER — TRAMADOL HYDROCHLORIDE 50 MG/1
50 TABLET ORAL EVERY 8 HOURS PRN
Qty: 20 TABLET | Refills: 1 | Status: SHIPPED | OUTPATIENT
Start: 2018-11-21

## 2018-11-22 NOTE — PROGRESS NOTES
History & Physical    SUBJECTIVE:     History of Present Illness:  Patient is a 53 y.o. female s/p I&D infected back cyst presents for wound check. She reports continued pain in the area but denies any more purulent drainage. She is washing the area and covering with bandage daily    Initiallyreferred for infected cyst.  Patient recently underwent incision and drainage of infected cyst on Monday but is still having purulent drainage and significant pain surrounding the site. She was put on antibiotics at that time.    Chief Complaint   Patient presents with    Post-op Evaluation       Review of patient's allergies indicates:  No Known Allergies    Current Outpatient Medications   Medication Sig Dispense Refill    lisinopril-hydrochlorothiazide (PRINZIDE,ZESTORETIC) 20-25 mg Tab Take 1 tablet by mouth once daily. 90 tablet 3    butalbital-acetaminophen-caffeine -40 mg (FIORICET, ESGIC) -40 mg per tablet Take 1 tablet by mouth daily as needed for Headaches. 30 tablet 0    meloxicam (MOBIC) 15 MG tablet Take 1 tablet (15 mg total) by mouth once daily. Take with food 30 tablet 0    traMADol (ULTRAM) 50 mg tablet Take 1 tablet (50 mg total) by mouth every 8 (eight) hours as needed for Pain. 20 tablet 1     No current facility-administered medications for this visit.        Past Medical History:   Diagnosis Date    AC (acromioclavicular) joint bone spurs     Hypertension      History reviewed. No pertinent surgical history.  History reviewed. No pertinent family history.  Social History     Tobacco Use    Smoking status: Current Every Day Smoker     Packs/day: 0.30     Years: 20.00     Pack years: 6.00     Types: Cigarettes   Substance Use Topics    Alcohol use: No    Drug use: No        Review of Systems:  Review of Systems   Constitutional: Negative for chills, fatigue, fever and unexpected weight change.   Respiratory: Negative for cough, shortness of breath, wheezing and stridor.   "  Cardiovascular: Negative for chest pain, palpitations and leg swelling.   Gastrointestinal: Negative for abdominal distention, abdominal pain, constipation, diarrhea, nausea and vomiting.   Genitourinary: Negative for difficulty urinating, dysuria, frequency, hematuria and urgency.   Skin: Positive for color change and wound. Negative for pallor and rash.   Hematological: Does not bruise/bleed easily.       OBJECTIVE:     Vital Signs (Most Recent)  Temp: 98.3 °F (36.8 °C) (11/21/18 1133)  Pulse: 96 (11/21/18 1133)  BP: (!) 161/103 (11/21/18 1133)  5' 2" (1.575 m)  102.8 kg (226 lb 10.1 oz)     Physical Exam:  Physical Exam   Constitutional: She is oriented to person, place, and time. She appears well-developed and well-nourished.   HENT:   Head: Normocephalic and atraumatic.   Eyes: EOM are normal.   Neck: Neck supple.   Cardiovascular: Normal rate and regular rhythm.   Pulmonary/Chest: Effort normal and breath sounds normal.   Abdominal: Soft. Bowel sounds are normal. She exhibits no distension. There is no tenderness.   Neurological: She is alert and oriented to person, place, and time.   Skin: Skin is warm and dry.        Vitals reviewed.        ASSESSMENT/PLAN:     53-year-old female s/p I&D infected cyst    PLAN:Plan     Cont daily wound care  Rx: ultram  Follow up 1 month       "

## 2019-04-08 ENCOUNTER — TELEPHONE (OUTPATIENT)
Dept: SURGERY | Facility: CLINIC | Age: 54
End: 2019-04-08

## 2019-04-08 NOTE — TELEPHONE ENCOUNTER
----- Message from Jamarcus Beltran sent at 4/8/2019  2:20 PM CDT -----  Contact: CHRISTIANO LUNA [[id number]]  Name of Who is Calling:CHRISTIANO LUNA [[id number]]      What is the request in detail: Patient would like to speak with staff in regards to still having some pain, burning, and itching in the procedure area. Please advise      Can the clinic reply by [facility]: no      What Number to Call Back if not in [facility]: [phone]

## 2019-04-15 ENCOUNTER — OFFICE VISIT (OUTPATIENT)
Dept: SURGERY | Facility: CLINIC | Age: 54
End: 2019-04-15

## 2019-04-15 VITALS
SYSTOLIC BLOOD PRESSURE: 184 MMHG | BODY MASS INDEX: 39.52 KG/M2 | WEIGHT: 214.75 LBS | HEART RATE: 102 BPM | DIASTOLIC BLOOD PRESSURE: 117 MMHG | TEMPERATURE: 98 F | HEIGHT: 62 IN

## 2019-04-15 DIAGNOSIS — L91.0 KELOID SCAR: Primary | ICD-10-CM

## 2019-04-15 PROCEDURE — 99499 NO LOS: ICD-10-PCS | Mod: S$PBB,,, | Performed by: SURGERY

## 2019-04-15 PROCEDURE — 99213 OFFICE O/P EST LOW 20 MIN: CPT | Mod: PBBFAC,PN | Performed by: SURGERY

## 2019-04-15 PROCEDURE — 99999 PR PBB SHADOW E&M-EST. PATIENT-LVL III: CPT | Mod: PBBFAC,,, | Performed by: SURGERY

## 2019-04-15 PROCEDURE — 99999 PR PBB SHADOW E&M-EST. PATIENT-LVL III: ICD-10-PCS | Mod: PBBFAC,,, | Performed by: SURGERY

## 2019-04-15 PROCEDURE — 99499 UNLISTED E&M SERVICE: CPT | Mod: S$PBB,,, | Performed by: SURGERY

## 2019-04-16 NOTE — PROGRESS NOTES
History & Physical    SUBJECTIVE:     History of Present Illness:  Patient is a 53 y.o. female s/p I&D infected back cyst presents for complaints of itching to left shoulder.  She reports significant itching to left shoulder and upper back that she cannot get comfortable from it. She feels is due to her prior incision and drainage for an infected back cyst from 11/14/2018.  She denies any other drainage, cyst formation or infection.  She reports she tries taking medications however it does not improved. (Pt disruptive in lobby prior to being seen threatening  staff and nursing, Of not pt no showed her last follow up after I&D)    -for wound check. She reports continued pain in the area but denies any more purulent drainage. She is washing the area and covering with bandage daily    Initiallyreferred for infected cyst.  Patient recently underwent incision and drainage of infected cyst on Monday but is still having purulent drainage and significant pain surrounding the site. She was put on antibiotics at that time.    Chief Complaint   Patient presents with    Follow-up     itching since november        Review of patient's allergies indicates:  No Known Allergies    Current Outpatient Medications   Medication Sig Dispense Refill    butalbital-acetaminophen-caffeine -40 mg (FIORICET, ESGIC) -40 mg per tablet Take 1 tablet by mouth daily as needed for Headaches. 30 tablet 0    lisinopril-hydrochlorothiazide (PRINZIDE,ZESTORETIC) 20-25 mg Tab Take 1 tablet by mouth once daily. 90 tablet 3    meloxicam (MOBIC) 15 MG tablet Take 1 tablet (15 mg total) by mouth once daily. Take with food 30 tablet 0    traMADol (ULTRAM) 50 mg tablet Take 1 tablet (50 mg total) by mouth every 8 (eight) hours as needed for Pain. 20 tablet 1     No current facility-administered medications for this visit.        Past Medical History:   Diagnosis Date    AC (acromioclavicular) joint bone spurs     Hypertension   "    History reviewed. No pertinent surgical history.  History reviewed. No pertinent family history.  Social History     Tobacco Use    Smoking status: Current Every Day Smoker     Packs/day: 0.30     Years: 20.00     Pack years: 6.00     Types: Cigarettes   Substance Use Topics    Alcohol use: No    Drug use: No        Review of Systems:  Review of Systems   Constitutional: Negative for chills, fatigue, fever and unexpected weight change.   Respiratory: Negative for cough, shortness of breath, wheezing and stridor.    Cardiovascular: Negative for chest pain, palpitations and leg swelling.   Gastrointestinal: Negative for abdominal distention, abdominal pain, constipation, diarrhea, nausea and vomiting.   Genitourinary: Negative for difficulty urinating, dysuria, frequency, hematuria and urgency.   Skin: Negative for color change, pallor, rash and wound.        itching   Hematological: Does not bruise/bleed easily.       OBJECTIVE:     Vital Signs (Most Recent)  Temp: 98.4 °F (36.9 °C) (04/15/19 1246)  Pulse: 102 (04/15/19 1246)  BP: (!) 184/117 (04/15/19 1246)  5' 2" (1.575 m)  97.4 kg (214 lb 11.7 oz)     Physical Exam:  Physical Exam   Constitutional: She is oriented to person, place, and time. She appears well-developed and well-nourished.   HENT:   Head: Normocephalic and atraumatic.   Eyes: EOM are normal.   Neck: Neck supple.   Cardiovascular: Normal rate and regular rhythm.   Pulmonary/Chest: Effort normal and breath sounds normal.   Abdominal: Soft. Bowel sounds are normal. She exhibits no distension. There is no tenderness.   Neurological: She is alert and oriented to person, place, and time.   Skin: Skin is warm and dry.        Vitals reviewed.        ASSESSMENT/PLAN:     53-year-old female previously s/p I&D infected cyst with keloid and itching    PLAN:Plan     Referral to Dermatology as patient is itching same is exaggerated from her surgery which was almost 5 months ago  Discussed her keloid " scarring as well as remaining punctum from cyst.  Discussed option of potential resection of keloid scar and remaining cyst punctum however would recommend meeting with dermatology 1st as her symptoms of itching are inconsistent with physical exam

## 2022-06-14 NOTE — TELEPHONE ENCOUNTER
Refilled meloxicam.  She cannot be/I will not prescribe both Fioricet and tramadol.  Which one would she like me to fill today?    Assuming tramadol is for knee pain, she has a torn meniscus. Reviewed ortho notes. If not better with conservative treatment needs to follow-up with orthopedist for surgery. I will not be able to prescribe tramadol long-term for her knee pain.  She will have to get it fixed.       Initial Size Of Lesion: 0.9